# Patient Record
Sex: FEMALE | Race: WHITE
[De-identification: names, ages, dates, MRNs, and addresses within clinical notes are randomized per-mention and may not be internally consistent; named-entity substitution may affect disease eponyms.]

---

## 2019-10-06 ENCOUNTER — HOSPITAL ENCOUNTER (EMERGENCY)
Dept: HOSPITAL 95 - ER | Age: 79
Discharge: HOME | End: 2019-10-06
Payer: MEDICARE

## 2019-10-06 VITALS — WEIGHT: 145 LBS | HEIGHT: 61 IN | BODY MASS INDEX: 27.38 KG/M2

## 2019-10-06 DIAGNOSIS — Z88.5: ICD-10-CM

## 2019-10-06 DIAGNOSIS — Z79.82: ICD-10-CM

## 2019-10-06 DIAGNOSIS — R07.9: Primary | ICD-10-CM

## 2019-10-06 DIAGNOSIS — Z87.891: ICD-10-CM

## 2019-10-06 DIAGNOSIS — Z79.899: ICD-10-CM

## 2019-10-06 LAB
ALBUMIN SERPL BCP-MCNC: 4.1 G/DL (ref 3.4–5)
ALBUMIN/GLOB SERPL: 1.2 {RATIO} (ref 0.8–1.8)
ALT SERPL W P-5'-P-CCNC: 25 U/L (ref 12–78)
ANION GAP SERPL CALCULATED.4IONS-SCNC: 6 MMOL/L (ref 6–16)
AST SERPL W P-5'-P-CCNC: 26 U/L (ref 12–37)
BASOPHILS # BLD AUTO: 0.05 K/MM3 (ref 0–0.23)
BASOPHILS NFR BLD AUTO: 1 % (ref 0–2)
BILIRUB SERPL-MCNC: 0.6 MG/DL (ref 0.1–1)
BUN SERPL-MCNC: 21 MG/DL (ref 8–24)
CALCIUM SERPL-MCNC: 10.1 MG/DL (ref 8.5–10.1)
CHLORIDE SERPL-SCNC: 107 MMOL/L (ref 98–108)
CO2 SERPL-SCNC: 26 MMOL/L (ref 21–32)
CREAT SERPL-MCNC: 0.98 MG/DL (ref 0.4–1)
DEPRECATED RDW RBC AUTO: 41.1 FL (ref 35.1–46.3)
EOSINOPHIL # BLD AUTO: 0.09 K/MM3 (ref 0–0.68)
EOSINOPHIL NFR BLD AUTO: 1 % (ref 0–6)
ERYTHROCYTE [DISTWIDTH] IN BLOOD BY AUTOMATED COUNT: 12.3 % (ref 11.7–14.2)
GLOBULIN SER CALC-MCNC: 3.3 G/DL (ref 2.2–4)
GLUCOSE SERPL-MCNC: 108 MG/DL (ref 70–99)
HCT VFR BLD AUTO: 45.1 % (ref 33–51)
HGB BLD-MCNC: 14.9 G/DL (ref 11.5–16)
IMM GRANULOCYTES # BLD AUTO: 0.01 K/MM3 (ref 0–0.1)
IMM GRANULOCYTES NFR BLD AUTO: 0 % (ref 0–1)
LYMPHOCYTES # BLD AUTO: 3.13 K/MM3 (ref 0.84–5.2)
LYMPHOCYTES NFR BLD AUTO: 34 % (ref 21–46)
MCHC RBC AUTO-ENTMCNC: 33 G/DL (ref 31.5–36.5)
MCV RBC AUTO: 92 FL (ref 80–100)
MONOCYTES # BLD AUTO: 0.56 K/MM3 (ref 0.16–1.47)
MONOCYTES NFR BLD AUTO: 6 % (ref 4–13)
NEUTROPHILS # BLD AUTO: 5.31 K/MM3 (ref 1.96–9.15)
NEUTROPHILS NFR BLD AUTO: 58 % (ref 41–73)
NRBC # BLD AUTO: 0 K/MM3 (ref 0–0.02)
NRBC BLD AUTO-RTO: 0 /100 WBC (ref 0–0.2)
PLATELET # BLD AUTO: 262 K/MM3 (ref 150–400)
POTASSIUM SERPL-SCNC: 4.5 MMOL/L (ref 3.5–5.5)
PROT SERPL-MCNC: 7.4 G/DL (ref 6.4–8.2)
SODIUM SERPL-SCNC: 139 MMOL/L (ref 136–145)
TROPONIN I SERPL-MCNC: <0.015 NG/ML (ref 0–0.04)

## 2020-12-23 ENCOUNTER — HOSPITAL ENCOUNTER (OUTPATIENT)
Dept: HOSPITAL 95 - LAB | Age: 80
Discharge: HOME | End: 2020-12-23
Attending: INTERNAL MEDICINE
Payer: MEDICARE

## 2020-12-23 DIAGNOSIS — R10.13: Primary | ICD-10-CM

## 2021-02-10 ENCOUNTER — HOSPITAL ENCOUNTER (OUTPATIENT)
Dept: HOSPITAL 95 - LAB SHORT | Age: 81
Discharge: HOME | End: 2021-02-10
Attending: OBSTETRICS & GYNECOLOGY
Payer: MEDICARE

## 2021-02-10 DIAGNOSIS — R93.89: Primary | ICD-10-CM

## 2021-05-07 ENCOUNTER — HOSPITAL ENCOUNTER (OUTPATIENT)
Dept: HOSPITAL 95 - ORSCSDS | Age: 81
Discharge: HOME | End: 2021-05-07
Attending: ANESTHESIOLOGY
Payer: MEDICARE

## 2021-05-07 VITALS — HEIGHT: 62.01 IN | WEIGHT: 154.32 LBS | BODY MASS INDEX: 28.04 KG/M2

## 2021-05-07 DIAGNOSIS — K44.9: ICD-10-CM

## 2021-05-07 DIAGNOSIS — E78.00: ICD-10-CM

## 2021-05-07 DIAGNOSIS — Z79.899: ICD-10-CM

## 2021-05-07 DIAGNOSIS — M50.122: Primary | ICD-10-CM

## 2021-05-07 DIAGNOSIS — Z79.82: ICD-10-CM

## 2021-05-07 DIAGNOSIS — I10: ICD-10-CM

## 2021-05-07 DIAGNOSIS — J44.9: ICD-10-CM

## 2021-05-07 PROCEDURE — 3E0R33Z INTRODUCTION OF ANTI-INFLAMMATORY INTO SPINAL CANAL, PERCUTANEOUS APPROACH: ICD-10-PCS | Performed by: ANESTHESIOLOGY

## 2021-05-07 NOTE — NUR
05/07/21 1149 Kathy Carlson
PT SEATED AT END OF SURGICAL BED IN ROLLING CHAIR WITH BACK. PT
LEANING FORWARD ONTO 2 STACKED PILLOWS WITH ARMS IN LAP

## 2021-05-07 NOTE — NUR
05/07/21 1042 ARI BARKSDALE
pt updated PROCEDURE TIME IS SCHEDULED FOR 11OO. PT DENIES QUESTIONS/
NEEDS AT THIS TIME.

## 2021-05-13 ENCOUNTER — HOSPITAL ENCOUNTER (OUTPATIENT)
Dept: HOSPITAL 95 - LAB SHORT | Age: 81
End: 2021-05-13
Attending: PATHOLOGY
Payer: MEDICARE

## 2021-05-13 DIAGNOSIS — D04.61: Primary | ICD-10-CM

## 2022-10-06 ENCOUNTER — HOSPITAL ENCOUNTER (OUTPATIENT)
Dept: HOSPITAL 95 - LAB | Age: 82
Discharge: HOME | End: 2022-10-06
Attending: OBSTETRICS & GYNECOLOGY
Payer: MEDICARE

## 2022-10-06 DIAGNOSIS — R93.89: Primary | ICD-10-CM

## 2022-11-14 ENCOUNTER — HOSPITAL ENCOUNTER (OUTPATIENT)
Dept: HOSPITAL 95 - ORSCSDS | Age: 82
Discharge: HOME | End: 2022-11-14
Attending: OBSTETRICS & GYNECOLOGY
Payer: MEDICARE

## 2022-11-14 VITALS — WEIGHT: 141.54 LBS | BODY MASS INDEX: 26.72 KG/M2 | HEIGHT: 61 IN

## 2022-11-14 DIAGNOSIS — Z79.899: ICD-10-CM

## 2022-11-14 DIAGNOSIS — Z79.82: ICD-10-CM

## 2022-11-14 DIAGNOSIS — K21.9: ICD-10-CM

## 2022-11-14 DIAGNOSIS — I10: ICD-10-CM

## 2022-11-14 DIAGNOSIS — E78.00: ICD-10-CM

## 2022-11-14 DIAGNOSIS — N95.0: Primary | ICD-10-CM

## 2022-11-14 DIAGNOSIS — R93.89: ICD-10-CM

## 2022-11-14 PROCEDURE — 0UDB8ZX EXTRACTION OF ENDOMETRIUM, VIA NATURAL OR ARTIFICIAL OPENING ENDOSCOPIC, DIAGNOSTIC: ICD-10-PCS | Performed by: OBSTETRICS & GYNECOLOGY

## 2025-01-06 NOTE — NUR
01/06/25 0735 ISIAH TERRY
BLOCK PROCEDURE START [id number] END. TIMEOUT COMPLETED 0720 AT BEDSIDE
WITH ANESTHESIA, PT, THIS RN.  SATS MONITORED T/O PROCEDURE.  NO
COMPLICATIONS.

## 2025-01-06 NOTE — NUR
01/06/25 1020 BRAEDEN LEROY
WAITING ON IMAGING FOR XRAY. PT DENIES PAIN, NAUSEA STARTING TO PASS.
STATES NUMBNESS AND TINGLING IN FINGERS IS WHAT IS BOTHERING HER AT
THIS TIME. PT ALERT AND TALKING. CONTINUES TO BE ON 2L PER NC. O2 SAT
CURRENTLY 94%